# Patient Record
Sex: MALE | Race: WHITE | NOT HISPANIC OR LATINO | Employment: OTHER | URBAN - NONMETROPOLITAN AREA
[De-identification: names, ages, dates, MRNs, and addresses within clinical notes are randomized per-mention and may not be internally consistent; named-entity substitution may affect disease eponyms.]

---

## 2023-04-04 DIAGNOSIS — J38.3 OTHER DISEASES OF VOCAL CORDS: ICD-10-CM

## 2023-06-05 RX ORDER — PANTOPRAZOLE SODIUM 40 MG/1
40 TABLET, DELAYED RELEASE ORAL 2 TIMES DAILY
COMMUNITY

## 2023-06-05 RX ORDER — WARFARIN SODIUM 5 MG/1
TABLET ORAL DAILY
COMMUNITY

## 2023-06-05 RX ORDER — LORATADINE 10 MG/1
10 TABLET ORAL DAILY
COMMUNITY

## 2023-06-05 RX ORDER — ATORVASTATIN CALCIUM 80 MG/1
80 TABLET, FILM COATED ORAL DAILY
COMMUNITY

## 2023-06-05 RX ORDER — ALBUTEROL SULFATE 90 UG/1
2 AEROSOL, METERED RESPIRATORY (INHALATION) EVERY 6 HOURS PRN
COMMUNITY

## 2023-06-05 RX ORDER — SELENIUM 50 MCG
TABLET ORAL DAILY
COMMUNITY

## 2023-06-05 RX ORDER — CLONAZEPAM 1 MG/1
1 TABLET ORAL 3 TIMES DAILY
COMMUNITY

## 2023-06-05 RX ORDER — VENLAFAXINE 75 MG/1
75 TABLET ORAL 2 TIMES DAILY
COMMUNITY

## 2023-06-05 RX ORDER — AMLODIPINE BESYLATE 10 MG/1
10 TABLET ORAL DAILY
COMMUNITY

## 2023-06-05 NOTE — PRE-PROCEDURE INSTRUCTIONS
Pre-Surgery Instructions:   Medication Instructions   • amLODIPine (NORVASC) 10 mg tablet Take day of surgery  • Aspirin 81 MG CAPS Stop taking 7 days prior to surgery  • atorvastatin (LIPITOR) 80 mg tablet Take day of surgery  • calcium polycarbophil (FIBERCON) 625 mg chewable tablet Hold day of surgery  • Cholecalciferol 25 MCG (1000 UT) capsule Hold day of surgery  • clonazePAM (KlonoPIN) 1 mg tablet Take day of surgery  • lactobacillus acidophilus Hold day of surgery  • loratadine (CLARITIN) 10 mg tablet Hold day of surgery  • pantoprazole (PROTONIX) 40 mg tablet Take day of surgery  • venlafaxine (EFFEXOR) 75 mg tablet Take day of surgery  • warfarin (COUMADIN) 5 mg tablet Stop taking 7 days prior to surgery  Medication instructions for day surgery reviewed  Please use only a sip of water to take your instructed medications  Avoid all over the counter vitamins, supplements and NSAIDS for one week prior to surgery per anesthesia guidelines  Tylenol is ok to take as needed  You will receive a call one business day prior to surgery with an arrival time and hospital directions  If your surgery is scheduled on a Monday, the hospital will be calling you on the Friday prior to your surgery  If you have not heard from anyone by 8pm, please call the hospital supervisor through the hospital  at [phone]  Tivis Mihaela [phone])  Do not eat or drink anything after midnight the night before your surgery, including candy, mints, lifesavers, or chewing gum  Do not drink alcohol 24hrs before your surgery  Try not to smoke at least 24hrs before your surgery  Follow the pre surgery showering instructions as listed in the [facility] Surgical Experience Booklet” or otherwise provided by your surgeon's office  Do not shave the surgical area 24 hours before surgery   Do not apply any lotions, creams, including makeup, cologne, deodorant, or perfumes after showering on the day of your surgery  No contact lenses, eye make-up, or artificial eyelashes  Remove nail polish, including gel polish, and any artificial, gel, or acrylic nails if possible  Remove all jewelry including rings and body piercing jewelry  Wear causal clothing that is easy to take on and off  Consider your type of surgery  Keep any valuables, jewelry, piercings at home  Please bring any specially ordered equipment (sling, braces) if indicated  Arrange for a responsible person to drive you to and from the hospital on the day of your surgery  Visitor Guidelines discussed  Call the surgeon's office with any new illnesses, exposures, or additional questions prior to surgery  Please reference your [facility] Surgical Experience Booklet” for additional information to prepare for your upcoming surgery

## 2023-06-06 ENCOUNTER — ANESTHESIA EVENT (OUTPATIENT)
Dept: PERIOP | Facility: HOSPITAL | Age: 73
End: 2023-06-06
Payer: COMMERCIAL

## 2023-06-07 NOTE — ANESTHESIA PREPROCEDURE EVALUATION
Procedure:  MICRODIRECT LARYNGOSCOPY WITH POSSIBLE VOCAL FOLD STRIPPING AND/OR BIOPSIES (Throat)  BIOPSY OF THE BASE OF TONGUE VALLECULAR CYST (Mouth)  FLEXIBLE ESOPHAGOSCOPY AND FLEXIBLE BRONCHOSCOPY (Esophagus)    Relevant Problems   No relevant active problems      Smoking    GERD    HTN    OSAS      Physical Exam    Airway    Mallampati score: II  TM Distance: >3 FB  Neck ROM: full     Dental   No notable dental hx     Cardiovascular  Cardiovascular exam normal    Pulmonary  Pulmonary exam normal     Other Findings        Anesthesia Plan  ASA Score- 3     Anesthesia Type- general with ASA Monitors  Additional Monitors:   Airway Plan:           Plan Factors-Exercise tolerance (METS): >4 METS  Chart reviewed  EKG reviewed  Imaging results reviewed  Existing labs reviewed  Patient summary reviewed  Patient is a current smoker  Patient instructed to abstain from smoking on day of procedure  Patient did not smoke on day of surgery  Obstructive sleep apnea risk education given perioperatively  Induction- intravenous  Postoperative Plan-     Informed Consent- Anesthetic plan and risks discussed with patient  I personally reviewed this patient with the CRNA  Discussed and agreed on the Anesthesia Plan with the CRNA  Sharri Hernandez

## 2023-06-08 ENCOUNTER — HOSPITAL ENCOUNTER (OUTPATIENT)
Facility: HOSPITAL | Age: 73
Setting detail: OUTPATIENT SURGERY
Discharge: HOME | End: 2023-06-08
Attending: OTOLARYNGOLOGY | Admitting: OTOLARYNGOLOGY
Payer: COMMERCIAL

## 2023-06-08 ENCOUNTER — ANESTHESIA (OUTPATIENT)
Dept: PERIOP | Facility: HOSPITAL | Age: 73
End: 2023-06-08
Payer: COMMERCIAL

## 2023-06-08 VITALS
HEIGHT: 73 IN | BODY MASS INDEX: 30.22 KG/M2 | HEART RATE: 62 BPM | WEIGHT: 228 LBS | OXYGEN SATURATION: 97 % | DIASTOLIC BLOOD PRESSURE: 65 MMHG | SYSTOLIC BLOOD PRESSURE: 141 MMHG | TEMPERATURE: 97.6 F | RESPIRATION RATE: 20 BRPM

## 2023-06-08 DIAGNOSIS — J38.3 OTHER DISEASES OF VOCAL CORDS: ICD-10-CM

## 2023-06-08 LAB
APTT PPP: 27 SECONDS (ref 23–37)
INR PPP: 0.97 (ref 0.84–1.19)
PROTHROMBIN TIME: 13 SECONDS (ref 11.6–14.5)

## 2023-06-08 PROCEDURE — 88360 TUMOR IMMUNOHISTOCHEM/MANUAL: CPT | Performed by: STUDENT IN AN ORGANIZED HEALTH CARE EDUCATION/TRAINING PROGRAM

## 2023-06-08 PROCEDURE — 88305 TISSUE EXAM BY PATHOLOGIST: CPT | Performed by: STUDENT IN AN ORGANIZED HEALTH CARE EDUCATION/TRAINING PROGRAM

## 2023-06-08 PROCEDURE — 85610 PROTHROMBIN TIME: CPT | Performed by: ANESTHESIOLOGY

## 2023-06-08 PROCEDURE — 88342 IMHCHEM/IMCYTCHM 1ST ANTB: CPT | Performed by: STUDENT IN AN ORGANIZED HEALTH CARE EDUCATION/TRAINING PROGRAM

## 2023-06-08 PROCEDURE — 85730 THROMBOPLASTIN TIME PARTIAL: CPT | Performed by: ANESTHESIOLOGY

## 2023-06-08 PROCEDURE — 88341 IMHCHEM/IMCYTCHM EA ADD ANTB: CPT | Performed by: STUDENT IN AN ORGANIZED HEALTH CARE EDUCATION/TRAINING PROGRAM

## 2023-06-08 RX ORDER — ALBUTEROL SULFATE 2.5 MG/3ML
2.5 SOLUTION RESPIRATORY (INHALATION) ONCE
Status: COMPLETED | OUTPATIENT
Start: 2023-06-08 | End: 2023-06-08

## 2023-06-08 RX ORDER — SODIUM CHLORIDE, SODIUM LACTATE, POTASSIUM CHLORIDE, CALCIUM CHLORIDE 600; 310; 30; 20 MG/100ML; MG/100ML; MG/100ML; MG/100ML
INJECTION, SOLUTION INTRAVENOUS CONTINUOUS PRN
Status: DISCONTINUED | OUTPATIENT
Start: 2023-06-08 | End: 2023-06-08

## 2023-06-08 RX ORDER — METOPROLOL TARTRATE 5 MG/5ML
INJECTION INTRAVENOUS AS NEEDED
Status: DISCONTINUED | OUTPATIENT
Start: 2023-06-08 | End: 2023-06-08

## 2023-06-08 RX ORDER — CEPHALEXIN 500 MG/1
500 CAPSULE ORAL EVERY 12 HOURS SCHEDULED
Qty: 20 CAPSULE | Refills: 0 | Status: SHIPPED | OUTPATIENT
Start: 2023-06-08 | End: 2023-06-18

## 2023-06-08 RX ORDER — ROCURONIUM BROMIDE 10 MG/ML
INJECTION, SOLUTION INTRAVENOUS AS NEEDED
Status: DISCONTINUED | OUTPATIENT
Start: 2023-06-08 | End: 2023-06-08

## 2023-06-08 RX ORDER — HYDROCODONE BITARTRATE AND ACETAMINOPHEN 5; 325 MG/1; MG/1
2 TABLET ORAL EVERY 6 HOURS PRN
Qty: 30 TABLET | Refills: 0 | Status: SHIPPED | OUTPATIENT
Start: 2023-06-08 | End: 2023-06-18

## 2023-06-08 RX ORDER — FENTANYL CITRATE 50 UG/ML
INJECTION, SOLUTION INTRAMUSCULAR; INTRAVENOUS AS NEEDED
Status: DISCONTINUED | OUTPATIENT
Start: 2023-06-08 | End: 2023-06-08

## 2023-06-08 RX ORDER — LIDOCAINE HYDROCHLORIDE 20 MG/ML
INJECTION, SOLUTION EPIDURAL; INFILTRATION; INTRACAUDAL; PERINEURAL AS NEEDED
Status: DISCONTINUED | OUTPATIENT
Start: 2023-06-08 | End: 2023-06-08

## 2023-06-08 RX ORDER — DEXAMETHASONE SODIUM PHOSPHATE 10 MG/ML
INJECTION, SOLUTION INTRAMUSCULAR; INTRAVENOUS AS NEEDED
Status: DISCONTINUED | OUTPATIENT
Start: 2023-06-08 | End: 2023-06-08

## 2023-06-08 RX ORDER — PROPOFOL 10 MG/ML
INJECTION, EMULSION INTRAVENOUS AS NEEDED
Status: DISCONTINUED | OUTPATIENT
Start: 2023-06-08 | End: 2023-06-08

## 2023-06-08 RX ORDER — ONDANSETRON 2 MG/ML
INJECTION INTRAMUSCULAR; INTRAVENOUS AS NEEDED
Status: DISCONTINUED | OUTPATIENT
Start: 2023-06-08 | End: 2023-06-08

## 2023-06-08 RX ORDER — FENTANYL CITRATE/PF 50 MCG/ML
50 SYRINGE (ML) INJECTION
Status: DISCONTINUED | OUTPATIENT
Start: 2023-06-08 | End: 2023-06-08 | Stop reason: HOSPADM

## 2023-06-08 RX ADMIN — ROCURONIUM BROMIDE 25 MG: 10 INJECTION, SOLUTION INTRAVENOUS at 09:35

## 2023-06-08 RX ADMIN — LIDOCAINE HYDROCHLORIDE 100 MG: 20 INJECTION, SOLUTION EPIDURAL; INFILTRATION; INTRACAUDAL; PERINEURAL at 09:35

## 2023-06-08 RX ADMIN — ALBUTEROL SULFATE 2.5 MG: 2.5 SOLUTION RESPIRATORY (INHALATION) at 08:33

## 2023-06-08 RX ADMIN — RACEPINEPHRINE HYDROCHLORIDE 0.5 ML: 11.25 SOLUTION RESPIRATORY (INHALATION) at 10:17

## 2023-06-08 RX ADMIN — METOROPROLOL TARTRATE 2.5 MG: 5 INJECTION, SOLUTION INTRAVENOUS at 09:47

## 2023-06-08 RX ADMIN — PROPOFOL 200 MG: 10 INJECTION, EMULSION INTRAVENOUS at 09:35

## 2023-06-08 RX ADMIN — DEXAMETHASONE SODIUM PHOSPHATE 10 MG: 10 INJECTION, SOLUTION INTRAMUSCULAR; INTRAVENOUS at 09:35

## 2023-06-08 RX ADMIN — FENTANYL CITRATE 50 MCG: 50 INJECTION INTRAMUSCULAR; INTRAVENOUS at 09:35

## 2023-06-08 RX ADMIN — FENTANYL CITRATE 50 MCG: 50 INJECTION INTRAMUSCULAR; INTRAVENOUS at 09:42

## 2023-06-08 RX ADMIN — SUGAMMADEX 200 MG: 100 INJECTION, SOLUTION INTRAVENOUS at 09:53

## 2023-06-08 RX ADMIN — ONDANSETRON 4 MG: 2 INJECTION INTRAMUSCULAR; INTRAVENOUS at 09:42

## 2023-06-08 RX ADMIN — SODIUM CHLORIDE, SODIUM LACTATE, POTASSIUM CHLORIDE, AND CALCIUM CHLORIDE: .6; .31; .03; .02 INJECTION, SOLUTION INTRAVENOUS at 09:31

## 2023-06-08 NOTE — OP NOTE
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]    Procedure(s): MICRODIRECT LARYNGOSCOPY WITH POSSIBLE VOCAL FOLD STRIPPING AND/OR BIOPSIES  BIOPSY OF THE BASE OF TONGUE VALLECULAR CYST  FLEXIBLE ESOPHAGOSCOPY AND FLEXIBLE BRONCHOSCOPY    Specimen(s):  * No specimens in log *    Estimated Blood Loss:   Minimal    Drains:  * No LDAs found *    Anesthesia Type:   General    Operative Indications: 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]      Operative Findings:  Vocal cord leukoplakia, base of tongue lesion, mucous plugging in the lungs and hiatal hernia sliding-type with mild gastritis    Complications:   None    Procedure and Technique:  The patient was identified and taken to the operative suite  A timeout weas called  A 6 0 endotracheal tube was used for intubation after the successful induction of general anesthesia and it was taped to the left lower lip  The table was turned and the Peak blade was used to cannulate the esophageal inlet and the flexible esophagoscope was inserted into the esophagus under direct visualization  Using air insufflation, the esophagus was examined circumferentially to the GE junction  The GE junction was found to be irregular  There was a hiatal hernia present  The stomach was examined to the pylorus and retroflexion showed the body and antrum as well  The scope was then withdrawn  Next, the Dedo laryngoscope was placed into position, offering adequate visualization of the vocal cords  The suspension apparatus was attached and suspended from the  [email or web address] stand  Using the microscope on a setting of 400, the vocal cords were visualized   The vocal folds were stripped anteriorly to posteriorly, sparing the vocalis muscle, removing the areas of concern  Next, the slim bronchoscope was used, with the Josh Gilbert blade's guidance to go around the endotracheal tube with the cuff deflated and visualized the tracheal rings and traci which were sharp  Segmental bronchi bilaterally were visualized and found to be without lesions, although mucous plugging was relieved  The scope was withdrawn  The vocal folds were reinspected and no significant bleeding was encountered  The Dedo laryngoscope was then backed up and the base of tongue visualized with some cystic lesion right greater than left lingual tonsil which was biopsied with the forceps without significant bleeding  The inferior tonsillar cyst was visualized and found to be without areas of suspicion  There are multiple other lingual tonsil areas, cystic in nature, which were left native  The suspension apparatus was relieved, the microscope withdrawn and the Dedo laryngoscope withdrawn and an oral airway was placed  The patient was awakened and extubated without incident and taken to the PACU in excellent condition  Instrument and sponge counts were correct x2 at the end of the case  I was present for the entire procedure      Patient Disposition:  PACU         SIGNATURE: Fernand Simmonds, MD  DATE: June 8, 2023  TIME: 9:23 AM

## 2023-06-08 NOTE — ANESTHESIA POSTPROCEDURE EVALUATION
Post-Op Assessment Note    CV Status:  Stable  Pain Score: 0    Pain management: adequate     Mental Status:  Sleepy and arousable   Hydration Status:  Stable   PONV Controlled:  Controlled   Airway Patency:  Patent      Post Op Vitals Reviewed: Yes      Staff: CRNA         No notable events documented      /76 (06/08/23 1010)    Temp 97 6 °F (36 4 °C) (06/08/23 1010)    Pulse 77 (06/08/23 1010)   Resp 19 (06/08/23 1010)    SpO2 93 % (06/08/23 1010)

## 2023-06-08 NOTE — INTERVAL H&P NOTE
H&P reviewed  After examining the patient I find no changes in the patients condition since the H&P had been written      Vitals:    06/08/23 0815   Pulse: 67   Resp: 18   Temp: (!) 97 2 °F (36 2 °C)   SpO2: 95%

## 2023-06-08 NOTE — DISCHARGE SUMMARY
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]    HPI: Status post MDL with vocal fold stripping, laryngoscopy, flexible esophagoscopy and flexible bronchoscopy    Procedures Performed: No orders of the defined types were placed in this encounter  Summary of Hospital Course: Unremarkable    Significant Findings, Care, Treatment and Services Provided: Surgery    Complications: None    Discharge Diagnosis: Vocal cord and base of tongue lesions    Medical Problems     Resolved Problems  Date Reviewed: 6/8/2023   None         Condition at Discharge: good         Discharge instructions/Information to patient and family:   See after visit summary for information provided to patient and family  Provisions for Follow-Up Care:  See after visit summary for information related to follow-up care and any pertinent home health orders  PCP: No primary care provider on file  Disposition: Home    Planned Readmission: No      Discharge Statement   I spent 15 minutes discharging the patient  This time was spent on the day of discharge  I had direct contact with the patient on the day of discharge  Additional documentation is required if more than 30 minutes were spent on discharge  Discharge Medications:  See after visit summary for reconciled discharge medications provided to patient and family

## 2023-06-15 PROCEDURE — 88342 IMHCHEM/IMCYTCHM 1ST ANTB: CPT | Performed by: STUDENT IN AN ORGANIZED HEALTH CARE EDUCATION/TRAINING PROGRAM

## 2023-06-15 PROCEDURE — 88341 IMHCHEM/IMCYTCHM EA ADD ANTB: CPT | Performed by: STUDENT IN AN ORGANIZED HEALTH CARE EDUCATION/TRAINING PROGRAM

## 2023-06-15 PROCEDURE — 88305 TISSUE EXAM BY PATHOLOGIST: CPT | Performed by: STUDENT IN AN ORGANIZED HEALTH CARE EDUCATION/TRAINING PROGRAM

## 2023-06-15 PROCEDURE — 88360 TUMOR IMMUNOHISTOCHEM/MANUAL: CPT | Performed by: STUDENT IN AN ORGANIZED HEALTH CARE EDUCATION/TRAINING PROGRAM

## 2023-10-25 ENCOUNTER — APPOINTMENT (OUTPATIENT)
Dept: LAB | Facility: HOSPITAL | Age: 73
End: 2023-10-25
Payer: COMMERCIAL

## 2023-10-25 ENCOUNTER — HOSPITAL ENCOUNTER (OUTPATIENT)
Dept: RADIOLOGY | Facility: HOSPITAL | Age: 73
Discharge: HOME | End: 2023-10-25
Payer: COMMERCIAL

## 2023-10-25 ENCOUNTER — HOSPITAL ENCOUNTER (OUTPATIENT)
Dept: CT IMAGING | Facility: HOSPITAL | Age: 73
Discharge: HOME | End: 2023-10-25
Payer: COMMERCIAL

## 2023-10-25 DIAGNOSIS — Z86.79 PERSONAL HISTORY OF OTHER DISEASES OF THE CIRCULATORY SYSTEM: ICD-10-CM

## 2023-10-25 DIAGNOSIS — K21.9 GASTRO-ESOPHAGEAL REFLUX DISEASE WITHOUT ESOPHAGITIS: ICD-10-CM

## 2023-10-25 DIAGNOSIS — Z86.79 HX OF AORTIC ANEURYSM: ICD-10-CM

## 2023-10-25 LAB
BUN SERPL-MCNC: 11 MG/DL (ref 5–25)
CREAT SERPL-MCNC: 1.07 MG/DL (ref 0.6–1.3)
GFR SERPL CREATININE-BSD FRML MDRD: 68 ML/MIN/1.73SQ M

## 2023-10-25 PROCEDURE — 82565 ASSAY OF CREATININE: CPT

## 2023-10-25 PROCEDURE — 84520 ASSAY OF UREA NITROGEN: CPT

## 2023-10-25 PROCEDURE — 71260 CT THORAX DX C+: CPT

## 2023-10-25 PROCEDURE — 36415 COLL VENOUS BLD VENIPUNCTURE: CPT

## 2023-10-25 PROCEDURE — 74220 X-RAY XM ESOPHAGUS 1CNTRST: CPT

## 2023-10-25 RX ADMIN — IOHEXOL 85 ML: 350 INJECTION, SOLUTION INTRAVENOUS at 11:32

## 2024-02-08 ENCOUNTER — PATIENT OUTREACH (OUTPATIENT)
Age: 74
End: 2024-02-08

## 2024-02-08 NOTE — PROGRESS NOTES
[facility] [place] Encounter      Client Name: Dave Smart  Date: 2/8/2024    Staff Name: Kay Coombs   Location: Residence    Start Time: 10:35am End Time: 12:15pm  Driving time: 2 hours RT          Goal for today's meeting: Explain VVP to  and determine eligibility for VVP if interested      Summary of today's meeting: Clinical coordinator conducted a pre-engagement meeting with [place]. He stated he was interested in the program. Eligibility was confirmed via SQUARES, CSSR-S, and psychosocial assessment.      Plan for next meeting: Clinical coordinator will complete remaining intake paperwork on Thursday 2/15/24.

## 2024-02-16 ENCOUNTER — PATIENT OUTREACH (OUTPATIENT)
Age: 74
End: 2024-02-16

## 2024-02-16 NOTE — PROGRESS NOTES
[facility] [place] Encounter      Client Name: Dave Smart  Date: 02/15/2024    Staff Name: Kay Coombs   Location: Residence    Start Time: 2:00pm End Time: 2:45pm  Driving time: 2 hours RT          Goal for today's meeting: Complete VVP intake paperwork and [facility] baseline MH assessments      Summary of today's meeting: Care manager completed all remaining VVP intake paperwork with [place]. Care manager also completed all VA baseline MH assessments with .  expressed a desire to quit smoking and is interested in looking into replacement behaviors to assist him with this. He is also interested in working with the [facility] for burial arrangements. Care manager will look into resources regarding his needs.      Plan for next meeting: Care manager will inform [place]'s CPS that an appointment can be scheduled moving forward. Care manager will meet with  during the week of 2/26/24.

## 2024-02-20 ENCOUNTER — TELEPHONE (OUTPATIENT)
Age: 74
End: 2024-02-20

## 2024-02-28 ENCOUNTER — PATIENT OUTREACH (OUTPATIENT)
Age: 74
End: 2024-02-28

## 2024-02-29 NOTE — PROGRESS NOTES
[facility] [place] Service Encounter      Client Name: Dave Smart  Date: 28FEB24    Staff Name: Tenzin   Location: [place] [facility]    Start Time: 0900 End Time: 1030  Driving time: 130mins          Goal for today's meeting: initial CPS meeting      Summary of today's meeting: Introduced ourselves, shared some related experiences      Plan for next meeting: Continue to build rapport

## 2024-03-06 ENCOUNTER — PATIENT OUTREACH (OUTPATIENT)
Age: 74
End: 2024-03-06

## 2024-03-06 NOTE — PROGRESS NOTES
"[facility] [place] Service Encounter      Client Name: Kareen Smart  Date: 6MAR24    Staff Name: Tenzin   Location: residence, [place]    Start Time: 0900 End Time: 1030  Driving time: 130mins          Goal for today's meeting: build rapport, program expectations      Summary of today's meeting: Today kareen shard some past experiences, I was able to validate what was shared. Kareen is unsure how he can benefit from the program. When asked directly about suicidal thoughts, he replied \"no\" without hesitation, I have no reason to believe otherwise. Kareen does present some mobility issues that will limit some of things we can do outside.      Plan for next meeting: continue to build rapport and discover where support can be provided  "

## 2024-03-12 ENCOUNTER — PATIENT OUTREACH (OUTPATIENT)
Age: 74
End: 2024-03-12

## 2024-03-12 ENCOUNTER — TELEPHONE (OUTPATIENT)
Age: 74
End: 2024-03-12

## 2024-03-12 NOTE — PROGRESS NOTES
[facility] [place] Encounter      Client Name: Dave Smart  Date: 13MAR24    Staff Name: Tenzin    Location:      Start Time:   End Time:    Driving time:            Goal for today's meeting:        Summary of today's meeting: Meeting cancelled(12MAR24)  Has [facility] appt. Confirmed meeting for 19MAR24      Plan for next meeting:

## 2024-03-20 ENCOUNTER — TELEPHONE (OUTPATIENT)
Age: 74
End: 2024-03-20

## 2024-03-20 ENCOUNTER — PATIENT OUTREACH (OUTPATIENT)
Age: 74
End: 2024-03-20

## 2024-03-20 NOTE — PROGRESS NOTES
[facility] [place] Encounter      Client Name: Dave Smart  Date: 20MAR24    Staff Name: Tenzin   Location:      Start Time:   End Time:    Driving time:            Goal for today's meeting:        Summary of today's meeting: Cancelled by phone by Dave at 0800. Reason provided: has [facility] appts today      Plan for next meeting:

## 2024-03-27 ENCOUNTER — PATIENT OUTREACH (OUTPATIENT)
Age: 74
End: 2024-03-27

## 2024-03-27 NOTE — PROGRESS NOTES
[facility] [place] En[facility]      Client Name: Dave Smart  Date: 27MAR24    Staff Name: Tenzin   Location: residence, [place]    Start Time: 0900 End Time: 1000  Driving time: 130mins          Goal for today's meeting: Catch up from last two missed appts      Summary of today's meeting: Dave shared a few new stories of his past today. I was to validate a couple of them. Dave is  unsure what exactly the program can assist him. I explained the best I could and made sure he understood that I am there for peer support. He said enjoys the conversation but doesn't believe VVP can assist with any goals. Dave has been unable to establish any SMART goal at this time.       Plan for next meeting: discuss potential goals

## 2024-04-03 ENCOUNTER — PATIENT OUTREACH (OUTPATIENT)
Age: 74
End: 2024-04-03

## 2024-04-04 NOTE — PROGRESS NOTES
[facility] [place] Encounter      Client Name: Dave Smart  Date: 3APR24    Staff Name: Tenzin   Location: [facility] [place]    Start Time: 0900 End Time: 1000  Driving time: 130mins          Goal for today's meeting: continue to build rapport, establish goal      Summary of today's meeting: Dave had now issue sharing today. We spoke of hunting most of our time today. Dave is finding it difficult to establish a goal.      Plan for next meeting: establish a goal

## 2024-04-10 ENCOUNTER — PATIENT OUTREACH (OUTPATIENT)
Age: 74
End: 2024-04-10

## 2024-04-11 NOTE — PROGRESS NOTES
[facility] [place] Encounter      Client Name: Dave DIAMOND Berry  Date: 10APR24    Staff Name: Tenzin   Location: [facility]    Start Time: 0900 End Time: 1020  Driving time: 130mins          Goal for today's meeting: establish goal/s      Summary of today's meeting: Today Dave and I discussed goals. After much conversation we were able to establish a goal.       Plan for next meeting: work  on goal

## 2024-04-17 ENCOUNTER — PATIENT OUTREACH (OUTPATIENT)
Age: 74
End: 2024-04-17

## 2024-04-17 NOTE — PROGRESS NOTES
[facility] [place] Encounter      Client Name: Dave Smart  Date: 17apr24    Staff Name: Tenzin   Location:      Start Time:   End Time:    Driving time:            Goal for today's meeting:        Summary of today's meeting: Cancelled by [facility]  reason: Mgr assigned task      Plan for next meeting:

## 2024-05-08 ENCOUNTER — PATIENT OUTREACH (OUTPATIENT)
Age: 74
End: 2024-05-08

## 2024-05-09 NOTE — PROGRESS NOTES
[facility] [place] Encounter      Client Name: Dave Smart  Date: May 8, 2024    Staff Name: Ajit Sutton   Location: [facility]    Start Time: 10:00 AM End Time: 11:15 AM  Driving time: 120 Min          Goal for today's meeting: Today's goal includes initial meeting.      Summary of today's meeting: This CPS met with Dave on May 8th at approximately 10 AM. This CPS conducted an initial meeting with Dave and reviewed the program. 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 Dave also discussed his hobbies and recreational goals for the future. This CPS also shared similar experiences and reviewed hobbies and interests during the meeting.       Plan for next meeting: Continue providing ongoing peer support services.

## 2024-05-17 ENCOUNTER — PATIENT OUTREACH (OUTPATIENT)
Age: 74
End: 2024-05-17

## 2024-05-22 ENCOUNTER — PATIENT OUTREACH (OUTPATIENT)
Age: 74
End: 2024-05-22

## 2024-05-22 NOTE — PROGRESS NOTES
[facility] [place] Service Encounter      Client Name: Dave Smart  Date: May 22, 2024    Staff Name: Ajit Sutton   Location: Office    Start Time: 8:55 AM End Time: 9:00 AM  Driving time: N/A          Goal for today's meeting: Cancellation of upcoming meeting      Summary of today's meeting: This CPS received a phone call from Dave regarding our upcoming meeting. Dave stated that our meeting will have to be cancelled due to  a medical appointment.        Plan for next meeting: Reschedule and provide ongoing peer support services.

## 2024-05-29 ENCOUNTER — PATIENT OUTREACH (OUTPATIENT)
Age: 74
End: 2024-05-29

## 2024-06-03 ENCOUNTER — PATIENT OUTREACH (OUTPATIENT)
Age: 74
End: 2024-06-03

## 2024-06-05 NOTE — PROGRESS NOTES
[facility] [place] Encounter      Client Name: Dave Smart  Date: 06/03/2024    Staff Name: Kay oCombs   Location: Residence    Start Time: 12:45pm End Time: 1:15pm  Driving time: 2 hours RT          Goal for today's meeting: Follow up on resources      Summary of today's meeting: [place] updated care manager on recent [facility] appointments. He received a new motorized scooter from the [facility] and was able to have a hitch installed on his vehicle to transport the scooter.  discussed his frustrations with his [facility] doctors; his doctors are frustrated with him due to him continuing to smoke.  is not interested in quitting his tobacco use at this time.       Plan for next meeting: Care manager will meet with Jose on 6/19/24.

## 2024-07-26 ENCOUNTER — PATIENT OUTREACH (OUTPATIENT)
Age: 74
End: 2024-07-26

## 2024-07-30 NOTE — PROGRESS NOTES
[facility] [place] Encounter      Client Name: Dave Smart  Date: July 26, 2024    Staff Name: Ajit Sutton   Location: [facility]    Start Time: 9:00 AM End Time: 10:00 AM  Driving time: 120 Min          Goal for today's meeting: Today's goal includes building rapport and reviewing current wellness goals.       Summary of today's meeting: This CPS met with Dave on July 26th at approximately 9 AM. During this meeting Dave discussed his current wellness plans for spending time with his children and grandchildren during the upcoming hunting season. Dave stated that his family comes together during the fall season and shares time with his family to be active outdoors. Dave also discussed his ongoing [facility] appointments with the [facility] for pain management and is hoping to return to discuss current treatment options.      Plan for next meeting: Continue providing ongoing peer support services.

## 2024-08-02 ENCOUNTER — PATIENT OUTREACH (OUTPATIENT)
Age: 74
End: 2024-08-02

## 2024-08-09 ENCOUNTER — PATIENT OUTREACH (OUTPATIENT)
Age: 74
End: 2024-08-09

## 2024-08-16 ENCOUNTER — PATIENT OUTREACH (OUTPATIENT)
Age: 74
End: 2024-08-16

## 2024-08-21 NOTE — PROGRESS NOTES
[facility] [place] Encounter      Client Name: Dave Smart  Date: August 16, 2024    Staff Name: Ajit Sutton   Location: August 16, 2024    Start Time: 9:00 AM End Time: 10:00 AM  Driving time: 120 Min          Goal for today's meeting: Today's goal includes discussing current wellness goals and providing peer support service.      Summary of today's meeting: This CPS met with Dave on August 16th at approximately 9 AM. During this meeting Dave discussed his current wellness goals that include spending time with his family and set up his property for the upcoming hunting season. Dave spent the majority of this meeting discussing his current relationship with his grandchildren and hobbies that he pursues.       Plan for next meeting: Continue providing ongoing peer support services.

## 2024-08-30 ENCOUNTER — PATIENT OUTREACH (OUTPATIENT)
Age: 74
End: 2024-08-30

## 2024-09-04 NOTE — PROGRESS NOTES
[facility] [place] Encounter      Client Name: Dave Smart  Date: August 30, 2024    Staff Name: Ajit Sutton   Location: [facility]    Start Time: 9:00 AM End Time: 10:00 AM  Driving time: 120 Min          Goal for today's meeting: Today's goal includes discussing Ju goals and providing peer support services.      Summary of today's meeting: This CPS met with Dave on August 30th at approximately 9 AM. During this meeting this CPS discussed Ju possible wellness goals and ways to complete them. Dave identified that he would like to get control of his pain management and is hoping to see a specialist to further discuss this concern. Dave finished this meeting by reviewing his past  history and family history.       Plan for next meeting: Continue providing ongoing peer support services.

## 2024-09-13 ENCOUNTER — PATIENT OUTREACH (OUTPATIENT)
Age: 74
End: 2024-09-13

## 2024-09-18 ENCOUNTER — PATIENT OUTREACH (OUTPATIENT)
Age: 74
End: 2024-09-18

## 2024-09-20 ENCOUNTER — PATIENT OUTREACH (OUTPATIENT)
Age: 74
End: 2024-09-20

## 2024-09-25 NOTE — PROGRESS NOTES
[facility] [place] Encounter      Client Name: Dave Smart  Date: September 20, 2024    Staff Name: Ajit Sutton   Location: [facility]    Start Time: 9:00 AM End Time: 10:00 AM  Driving time: 120 Min          Goal for today's meeting: Today's goal includes conducting an outdoor wellness activity and providing peer support services.      Summary of today's meeting: This CPS met with Dave on September 20th at approximately 9 AM. During this meeting Dave requested to conduct an outdoor wellness activity on his property. This CPS was introduced to Ju property boundaries and places he and his family frequent to relax during the day. Dave is hoping to enjoy more time outdoors during the hunting season and is also looking forward to spending increased time with his grandson.      Plan for next meeting: Continue providing ongoing peer support services.

## 2024-10-04 ENCOUNTER — PATIENT OUTREACH (OUTPATIENT)
Age: 74
End: 2024-10-04

## 2024-10-08 NOTE — PROGRESS NOTES
[facility] [place] Encounter      Client Name: Dave Smart  Date: 9/18/2024    Staff Name: Cassidy Noble   Location: [facility]    Start Time: 1130 am End Time: 1200 pm  Driving time: 120 Minutes RT          Goal for today's meeting: To complete VVP paperwork.      Summary of today's meeting: CM met with [place] to complete the PHQ-9, VICTOR HUGO, [place], GSE and Satisfaction Survey. All were completed by [place].       Plan for next meeting: Offer resources as needed.

## 2024-10-09 NOTE — PROGRESS NOTES
[facility] [place] Encounter      Client Name: Dave Smart  Date: October 4, 2024    Staff Name: Ajit Sutton   Location: [facility]    Start Time: 9:00 AM End Time: 10:00 AM  Driving time: 120 Min          Goal for today's meeting: Today's goal includes reviewing Ju current wellness goals and provide peer support services.      Summary of today's meeting: This CPS met with Dave on October 4th at approximately 9 AM. During this meeting Dave discussed spending time with his son and grandchildren during the hunting season. Dave also stated that he is hoping to remain active and will be discussing pain management treatments at his next medical appointment. Dave finished this meeting discussing his prior employment and residence history.       Plan for next meeting: Continue providing peer support services.

## 2024-10-25 ENCOUNTER — PATIENT OUTREACH (OUTPATIENT)
Age: 74
End: 2024-10-25

## 2024-10-31 NOTE — PROGRESS NOTES
[facility] [place] Service Encounter      Client Name: Dave Smart  Date: October 25, 2024    Staff Name: Ajit Sutton   Location: [facility]    Start Time: 9:00 AM End Time: 10:00 AM  Driving time: 120 AM          Goal for today's meeting: Today's goal includes discussing current wellness goals and providing peer support services.      Summary of today's meeting: This CPS met with Dave on October 25th at approximately 9 AM. During this meeting Dave discussed his current enjoyment during the hunting season. Dave stated that he has been enjoying time on his property with his friends and family hunting their land. Dave also stated that between his outdoor chores and hunting his day is usually finished by the early afternoon. During our meeting Dave shared past  history and prior duties during service.       Plan for next meeting: Continue providing ongoing peer support services.

## 2024-11-01 ENCOUNTER — PATIENT OUTREACH (OUTPATIENT)
Age: 74
End: 2024-11-01

## 2024-11-06 NOTE — PROGRESS NOTES
[facility] [place] Encounter      Client Name: Dave Smart  Date: November 1, 2024    Staff Name: Ajit Sutton   Location: [facility]    Start Time: 9:00 AM End Time: 10:00 AM  Driving time: 120 Min          Goal for today's meeting: Today's goal includes discussing Ju wellness goals and providing peer support services.      Summary of today's meeting: This CPS met with Dave on November 1st at approximately 9 AM. During this time Dave stated that he has been having leg pain and has been unable to work outside for any long extended time. Dave discussed his upcoming appointment and stated the plans to discuss his lack of mobility in future medical meetings. Dave discussed his excitement for the ongoing hunting season and is hoping to spend time with family and friends during the season while trying to stay active outdoors.      Plan for next meeting: Continue providing ongoing peer support services.

## 2024-11-08 ENCOUNTER — PATIENT OUTREACH (OUTPATIENT)
Age: 74
End: 2024-11-08

## 2024-11-13 NOTE — PROGRESS NOTES
[facility] [place] Encounter      Client Name: Dave Smart  Date: November 8, 2024    Staff Name: Ajit Sutton   Location: [facility]    Start Time: 9:00 AM End Time: 10:00 AM  Driving time: 120 Min          Goal for today's meeting: Today's goal includes reviewing Ju current health concerns and discussing his wellness goals.      Summary of today's meeting: This [facility] met with Dave on November 8th at approximately 9 AM. During this meeting Dave discussed his current health concerns and upcoming meetings with [facility] to discuss them. Dave also reviewed his goals to spend time with his family during the fall and current hunting season. Dave discussed plans to visit family in the coming weeks. Dave finished this meeting discussing his  service.      Plan for next meeting: Continue providing ongoing peer support services.

## 2024-11-15 ENCOUNTER — PATIENT OUTREACH (OUTPATIENT)
Age: 74
End: 2024-11-15

## 2024-11-22 ENCOUNTER — PATIENT OUTREACH (OUTPATIENT)
Age: 74
End: 2024-11-22

## 2024-12-04 ENCOUNTER — PATIENT OUTREACH (OUTPATIENT)
Age: 74
End: 2024-12-04

## 2024-12-04 NOTE — PROGRESS NOTES
[facility] [place] Encounter      Client Name: Dave Smart  Date: November 29, 2024    Staff Name: Ajit Sutton   Location: [facility]    Start Time: 9:00 AM End Time: 10:00 AM  Driving time: 120 Min          Goal for today's meeting: Today's goal includes discussing Ju ongoing wellness goals and provide peer support services.      Summary of today's meeting: This CPS met with Dave on November 29th at approximately 9 AM. During this meeting Dave discussed his ongoing goal of being active during the hunting season. Dave also expressed his concerns wit his current health that includes his mobility. Dave stated that he has an upcoming medical appointment and will be discussing this further with his primary care physician. Dave finished this meeting discussing his family dynamic and future holiday goals with them.      Plan for next meeting: Continue providing ongoing peer support services.

## 2024-12-13 ENCOUNTER — PATIENT OUTREACH (OUTPATIENT)
Age: 74
End: 2024-12-13

## 2024-12-27 ENCOUNTER — PATIENT OUTREACH (OUTPATIENT)
Age: 74
End: 2024-12-27

## 2025-01-07 NOTE — PROGRESS NOTES
[facility] Encounter      Client Name: Dave Smart  Date: December 27, 2024    Staff Name: Ajit Sutton   Location: [facility]    Start Time: 9:00 AM End Time: 10:00 AM  Driving time: 120 Min          Goal for today's meeting: Today's goal includes discussing holiday goals and personal wellness while providing peer support services.      Summary of today's meeting: This CPS met with Dave on December 27th at approximately 9 AM. During this meeting Dave shared his current holiday goals and his upcoming plans for the hunting season. This CPS also discussed his current goals as well as his past medical history. Dave finished this meeting discussing his service and past  history.      Plan for next meeting: Continue providing ongoing peer support services.

## 2025-01-10 ENCOUNTER — PATIENT OUTREACH (OUTPATIENT)
Age: 75
End: 2025-01-10

## 2025-01-13 ENCOUNTER — PATIENT OUTREACH (OUTPATIENT)
Age: 75
End: 2025-01-13

## 2025-01-15 NOTE — PROGRESS NOTES
[facility] [place] En[facility]      Client Name: Dave Smart  Date: January 10, 2024    Staff Name: Ajit Sutton   Location: [facility]    Start Time: 9;00 AM End Time: 10:00 AM  Driving time: 120 Min          Goal for today's meeting: Today's goal includes meeting to discuss current wellness goals and provide peer support services.      Summary of today's meeting: This CPS met with Dave on January 10th at approximately 9 AM. During this meeting Dave discussed his goals of continue hunting during the current season. Dave also stated that he is hoping to spend time with his son and grandson in the near future for hunting trips. Dave also discussed his ongoing lower back pain and stated that he will be talking to his primary care physician in the near future.      Plan for next meeting: Continue providing ongoing peer  support services.

## 2025-01-17 ENCOUNTER — PATIENT OUTREACH (OUTPATIENT)
Age: 75
End: 2025-01-17

## 2025-01-20 ENCOUNTER — PATIENT OUTREACH (OUTPATIENT)
Age: 75
End: 2025-01-20

## 2025-01-24 ENCOUNTER — PATIENT OUTREACH (OUTPATIENT)
Age: 75
End: 2025-01-24

## 2025-01-31 ENCOUNTER — PATIENT OUTREACH (OUTPATIENT)
Age: 75
End: 2025-01-31

## 2025-02-03 ENCOUNTER — PATIENT OUTREACH (OUTPATIENT)
Age: 75
End: 2025-02-03

## 2025-02-05 NOTE — PROGRESS NOTES
[facility] [place] Encounter      Client Name: Dave Smart  Date: January 24, 2024    Staff Name: Ajit Sutton   Location: [facility]    Start Time: 9:00 AM End Time: 10:00 AM  Driving time: 120 Min          Goal for today's meeting: Today's goals include discussing current wellness goals and goals pertaining to mobility and positive health.      Summary of today's meeting: This CPS met with Dave on January 24th at approximately 9 AM. During this meeting Dave stated that he has been having trouble being mobile due to his health and leg strength. Dave also reviewed his current goals with this CPS. Dave stated that he is hoping to consider alternate heating sources for their home and has been researching options to consider.      Plan for next meeting: Continue providing ongoing peer support services.

## 2025-02-14 ENCOUNTER — PATIENT OUTREACH (OUTPATIENT)
Age: 75
End: 2025-02-14

## 2025-02-20 ENCOUNTER — PATIENT OUTREACH (OUTPATIENT)
Age: 75
End: 2025-02-20

## 2025-02-26 NOTE — PROGRESS NOTES
"[facility] [place] Encounter      Client Name: Dave Smart  Date: 2/20/2025    Staff Name: Siomara Langstonieux   Location: Residence    Start Time: 1100 End Time: 1200  Driving time: 2 hrs          Goal for today's meeting: Initial meeting, begin rapport building process      Summary of today's meeting: Today I met with Dave in his home for our first meeting. I spent the majority of the time listening to Dave share stories of his life and career. Dave shared many stories about his  experiences and how they have impacted his physical and mental health. Dave stated that his knees were  \"shot\" and that there was nothing any of the medical providers could due to repair their condition. Dave also shared information on his family with me, and stories regarding his sons and daughter. He shared that his daughter had passed a number of years ago from substance abuse. I supported Dave by listening to his stories, and validating the experiences and emotions he has been through and has felt. We also talked about how he is looking forward to warmer weather so he can get outside and enjoy his property. He did say it is getting harder and harder to get around mobility wise, but that he is still managing. Overall, Dave seemed appreciative to have someone to talk to about his life and experiences.      Plan for next meeting: Continue rapport building   "

## 2025-02-27 ENCOUNTER — PATIENT OUTREACH (OUTPATIENT)
Age: 75
End: 2025-02-27

## 2025-03-05 ENCOUNTER — PATIENT OUTREACH (OUTPATIENT)
Age: 75
End: 2025-03-05

## 2025-03-06 NOTE — PROGRESS NOTES
"[facility] [place] Encounter      Client Name: Dave Smart  Date: 2/27/2025    Staff Name: Siomara Acevedo   Location: Phone    Start Time: 1300 End Time: 1345  Driving time: n/a          Goal for today's meeting: Check-in      Summary of today's meeting: Today I had a brief check in with Dave. He and his GF were in the middle of \"spring cleaning\" when I called. He informed me that they were cleaning the windows and changing the curtains. He spent some time discussing some medical conditions he chronically experiences, such as with his knees, and explained that those issues make it hard to maintain the home. We discussed how Dave was looking forward to warmer weather so he can get outside on his tractor. We ended the conversation by planning our next in-person meeting.      Plan for next meeting: Check- in (in person)  "

## 2025-03-06 NOTE — PROGRESS NOTES
"[facility] [place] Encounter      Client Name: Dave Smart  Date: 3/5/2025    Staff Name: Siomara Acevedo   Location: Residence    Start Time: 1200 End Time: 1300  Driving time: 2 hrs          Goal for today's meeting: Check-in      Summary of today's meeting: I met with Dave at his residence today for our weekly check-in. Dave shared with me that he felt \"pretty soar and banged up\" from being out on the tractor the day prior. During our meeting, Dave shared his knowledge of VA benefits with me and shared what the VA has done to assist him. He also discussed his love for hunting and fishing and shared stories of when he was younger and would hunt and fish with friends and family. I also learned that Dave had a love for [place] and used to play in semi-professional tournaments. He stated that he can no longer do those things because of his lack of mobility but still likes to get outside and around his property. Dave seemed in good spirits during our conversation and stated that he enjoys the conversations we have during our one on ones      Plan for next meeting: Check-in  "

## 2025-03-21 ENCOUNTER — PATIENT OUTREACH (OUTPATIENT)
Age: 75
End: 2025-03-21

## 2025-03-28 ENCOUNTER — PATIENT OUTREACH (OUTPATIENT)
Age: 75
End: 2025-03-28

## 2025-03-28 NOTE — PROGRESS NOTES
[facility] [place] Encounter      Client Name: Dave Smart  Date: March 28, 2025    Staff Name: Ajit Sutton   Location: [facility]    Start Time: 10:00 AM End Time: 11:00 AM  Driving time: 120 Min          Goal for today's meeting: Today's goals include discussing current wellness goals and providing ongoing peer support services.      Summary of today's meeting: This CPS met with Dave on March 28th at approximately 10 AM. During this time Dave stated that he has been having severe back pain and has scheduled  [facility] appointment to review with his doctors. Dave shared that his favorite pastime in recent weeks and been watching television and playing cards. Dave shared that he has enjoyed playing cards with close friends and family in the past and is hoping to set something up in the future.      Plan for next meeting: Continue providing ongoing peer support services.

## 2025-03-28 NOTE — PROGRESS NOTES
[facility] [place] Encounter      Client Name: Dave Smart  Date: March 21, 2025    Staff Name: Ajit Sutton   Location: [facility]    Start Time: 10:30 AM End Time: 11:30 AM  Driving time: 120 Min          Goal for today's meeting: Today's goal includes discussing Ju ongoing goals and providing continued peer support services.      Summary of today's meeting: This CPS met with Dave on March 21st at approximately 10:30 AM. During this meeting Dave stated that he has been enjoying time indoors watching television. Dave stated that he is looking forward to his medical appointment to discuss ongoing pain concerns. Dave stated that he is looking forward to warmer weather and is hoping to work on his property.      Plan for next meeting: Continue providing ongoing peer support services.

## 2025-04-02 ENCOUNTER — PATIENT OUTREACH (OUTPATIENT)
Age: 75
End: 2025-04-02

## 2025-04-02 NOTE — PROGRESS NOTES
[facility] [place]      Client Name: Dave Smart  Date: 4/2/2025    Staff Name: Cassidy Noble   Location: [facility]    Start Time: 11:30 am End Time: 12:00 pm  Driving time: 120 Minutes Round Trip          Goal for today's meeting: To complete paperwork questionnaires.      Summary of today's meeting: CM conducted the PHQ-9, [place] and GSE questionnaires with . [place] is positive and looks forward to his future.      Plan for next meeting: To meet with assigned CPS for weekly meeting.

## 2025-04-04 ENCOUNTER — PATIENT OUTREACH (OUTPATIENT)
Age: 75
End: 2025-04-04

## 2025-04-07 NOTE — PROGRESS NOTES
[facility] [place] Encounter      Client Name: Dave Smart  Date: April 4, 2025    Staff Name: Ajit Sutton   Location: [facility]    Start Time: 10:00 AM End Time: 11:00 AM  Driving time: 120 Min          Goal for today's meeting: Today's goals include discussing personal wellness goals and providing peer support services.      Summary of today's meeting: This CPS met with Dave on April 4th at approximately 10 AM. During this meeting Dave stated that he has been having back pain and has been unable to help around the property. Dave stated that he will be discussing with his med management doctor at his next visit and stated that it has been progressing. Dave also shared about home improvements and is happy to continually see his house get updated. During this meeting Dave continued talking about getting outside as soon as he's able.      Plan for next meeting: Continue providing ongoing peer support services.

## 2025-04-18 ENCOUNTER — PATIENT OUTREACH (OUTPATIENT)
Age: 75
End: 2025-04-18

## 2025-05-01 NOTE — PROGRESS NOTES
[facility] [place] Encounter      Client Name: Dave Smart  Date: April 18, 2025    Staff Name: Ajit Sutton   Location: [facility]    Start Time: 10:30 AM End Time: 11:30 AM  Driving time: 120 Min          Goal for today's meeting: Today's goal includes discussing Ju current wellness goals and provide peer support services.      Summary of today's meeting: This CPS met with Dave on April 18th at approximately 10:30 AM. During this meeting Dave reviewed his current goals with this CPS. Dave stated that he is hoping to spend more time outside in the warmer weather. Dave also stated that he would also like to meet with his medical provider to discuss more pain management strategies. Dave stated that his lower back pain and leg pain has been getting worse and will be discussing this at his next medical appointment.      Plan for next meeting: Continue providing ongoing peer support services.

## 2025-05-09 ENCOUNTER — PATIENT OUTREACH (OUTPATIENT)
Age: 75
End: 2025-05-09

## 2025-05-21 NOTE — PROGRESS NOTES
[facility] [place] Encounter      Client Name: Dave Smart  Date: May 9, 2025    Staff Name: Ajit Sutton   Location: [facility]    Start Time: 10:00 AM End Time: 10:15 AM  Driving time: N/A          Goal for today's meeting: Today's goal includes reviewing Dave's current wellness goals and provide peer support services.      Summary of today's meeting: This CPS met with Ju via telephone on May 9th at approximately 10 AM. During this meeting Dave and his wife discussed their current shared goals of maintaining the property and staying healthy. This CPS discussed our current schedule and reviewed possible dates to meet in person due to this meeting being moved to phone.       Plan for next meeting: Continue providing ongoing peer support services.

## 2025-06-03 ENCOUNTER — PATIENT OUTREACH (OUTPATIENT)
Age: 75
End: 2025-06-03

## 2025-06-05 NOTE — PROGRESS NOTES
[facility] [place] En[facility]      Client Name: Dave Smart  Date: 3 Kezia 2025    Staff Name: [facility] Hensonell   Location: Residence    Start Time: 1300 End Time: 1415  Driving time: 1 hour 10 min          Goal for today's meeting: Initial meeting      Summary of today's meeting:     Goals:   There were really no goals in his wellness plan. Plan is to create some next metting    Narrative:  Met with Goldsmith at home at 1300 for initial encounter. He was in a lot of pain but was out with partner doing physical labor around their property. He was in the best spirits considering his back pain. He mentioned he will soon be going to a specialist to see if there is anything that can be corrected, but he is not hopeful. We discussed meeting every week and that next appointment we will be going over and setting some goals within his limitations.       Plan for next meeting: We will discuss and create some practical goals to work toward and discuss there back appointment.

## 2025-06-11 ENCOUNTER — PATIENT OUTREACH (OUTPATIENT)
Age: 75
End: 2025-06-11

## 2025-06-13 NOTE — PROGRESS NOTES
[facility] [place] Encounter      Client Name: Dave Smart  Date: 11 June 2025    Staff Name: [facility] Maynard   Location: Residence    Start Time: 1300 End Time: 1400  Driving time: 1 hour          Goal for today's meeting: Continue to identify goals to work toward.      Summary of today's meeting:   Goals:   Having some difficulty identifying goals within his limitations. Well think of some creative suggestions so that  can create one or two and have something solid to work toward.   Narrative:  Met with  at home at 1300. He was in some pain again, but we had our discussion outside on the porch instead of inside which he usually prefers. We talked about his family and outdoors activities. He is planning to help chop wood this weekend which gets him outside.         Plan for next meeting: Next week we will come up with at least one goal to work toward that will benefit him physically and mentally within his capabilities.

## 2025-06-18 ENCOUNTER — PATIENT OUTREACH (OUTPATIENT)
Age: 75
End: 2025-06-18

## 2025-07-01 NOTE — PROGRESS NOTES
[facility] [place] Encounter      Client Name: Dave Smart  Date: 18 June 2025    Staff Name: [facility] Maynard   Location: Home    Start Time: 1200 End Time: 1300  Driving time: 1 hour          Goal for today's meeting: Attempt to create goals within limitations      Summary of today's meeting:   Goals:   Having some difficulty identifying goals within his limitations. Will think of some creative suggestions so that [place] can create one or two and have something solid to work toward.   Narrative:  Met with  at home at 1300. He had been sleeping and we did our encounter inside. He had gone to another spine specialist with no good news on them being able to help with his pain and mobility. He expressed the dislike for the shots they can give him for his pain because of the drive and length of pain free time from each. Stated he would rather deal with the pain. He is open to other options but not very optimistic. He did want to be out more often helping his partner with chores around the property and this will most likely become his goal.      Plan for next meeting:   Next week we will continue to work toward him being outside more often within his limits to benefit him physically and mentally within his capabilities.

## 2025-07-02 ENCOUNTER — PATIENT OUTREACH (OUTPATIENT)
Age: 75
End: 2025-07-02

## 2025-07-09 ENCOUNTER — PATIENT OUTREACH (OUTPATIENT)
Age: 75
End: 2025-07-09

## 2025-07-11 NOTE — PROGRESS NOTES
[facility] [place] Encounter      Client Name: Dave Smart  Date: 2 July 2025    Staff Name: [facility] Maynard   Location: Home    Start Time: 1200 End Time: 1300  Driving time: 1 hour          Goal for today's meeting: Discuss 's goals and progress      Summary of today's meeting:   Goals:   Goal 1 Do more work with partner around the outside of their home.   Narrative:  Met with  at home at 1300. He has been having a lot of knee complications and made an appointment to take care of the pain. He had said there is little they can do due to other surgeries he has had. He also stated he was going to be getting outside more to help do things around the property like cutting wood and the grass. We had decided that this would be a goal for him to do it atleast a couple days a week pending any mobility issues.        Plan for next meeting:   Next week we will continue to work toward him being outside more often within his limits to benefit him physically and mentally within his capabilities.

## 2025-07-16 ENCOUNTER — PATIENT OUTREACH (OUTPATIENT)
Age: 75
End: 2025-07-16

## 2025-07-18 NOTE — PROGRESS NOTES
[facility] [place] Encounter      Client Name: Dave Smart  Date: 9 July 2025    Staff Name: [place] Maynard   Location: Home    Start Time: 1200 End Time: 1300  Driving time: 1 hour          Goal for today's meeting: Discuss goals and progress      Summary of today's meeting:   Goals:   Goal 1 Do more work with partner around the outside of their home.   Narrative:  Met with  at home at 1200. After another evaluation, he was told again that there is nothing more they can do for his knees. Pain management is his only option. He had got out a couple days early before the heat and rain to split wood and help his significant other. His pain levels are high but he continues to drive on.        Plan for next meeting:   Next week we will continue to work toward him being outside more often within his limits to benefit him physically and mentally within his capabilities.

## 2025-07-24 ENCOUNTER — PATIENT OUTREACH (OUTPATIENT)
Age: 75
End: 2025-07-24

## 2025-07-25 NOTE — PROGRESS NOTES
[facility] [place] Encounter      Client Name: Dave Smart  Date: 16 July 2025    Staff Name: [facility] Maynard    Location: Home    Start Time: 1200 End Time: 1300  Driving time: 1 hour           Goal for today's meeting: Discuss goals and progress      Summary of today's meeting:   Goals:   Goal 1 Do more work with partner around the outside of their home.   Narrative:  Met with  at home at 1300. He had received some shots in his knee a couple days prior for pain management and he seemed more mobile with little to no pain. He had gone out twice to help with chores around the property but came back in early due to the high heat. He has an appointment with a pain management specialist coming up since there is nothing permanent that can be done for his knees. I had talked with him about a future case load increase and he was open to meeting every other week instead of each.        Plan for next meeting:   Next appointment we will continue to work toward him being outside more often within his limits to benefit him physically and mentally within his capabilities.

## 2025-07-30 ENCOUNTER — PATIENT OUTREACH (OUTPATIENT)
Age: 75
End: 2025-07-30

## (undated) DEVICE — DECANTER: Brand: UNBRANDED

## (undated) DEVICE — SUT CHROMIC 4-0 P-3 18 MM 1654G

## (undated) DEVICE — SINGLE PORT MANIFOLD: Brand: NEPTUNE 2

## (undated) DEVICE — NEEDLE 18 G X 1 1/2

## (undated) DEVICE — PAD GROUNDING ADULT

## (undated) DEVICE — HYDROPHILIC WOUND DRESSING WITH ZINC PLUS VITAMINS A AND B6.: Brand: DERMAGRAN®-B

## (undated) DEVICE — MEDI-VAC YANK SUCT HNDL W/TPRD BULBOUS TIP: Brand: CARDINAL HEALTH

## (undated) DEVICE — BULB SYRINGE,IRRIGATION WITH PROTECTIVE CAP: Brand: DOVER

## (undated) DEVICE — ADHESIVE SKIN CLSR DERMABOND NX

## (undated) DEVICE — INTENDED FOR TISSUE SEPARATION, AND OTHER PROCEDURES THAT REQUIRE A SHARP SURGICAL BLADE TO PUNCTURE OR CUT.: Brand: BARD-PARKER SAFETY BLADES SIZE 15, STERILE

## (undated) DEVICE — 10FR FRAZIER SUCTION HANDLE: Brand: CARDINAL HEALTH

## (undated) DEVICE — SUT CHROMIC 4-0 PS-2 18 IN 1637G

## (undated) DEVICE — ASCOPE 4 RHINOLARYNGO SLIM: Brand: ASCOPE™ 4 RHINOLARYNGO SLIM

## (undated) DEVICE — TIBURON SPLIT SHEET: Brand: CONVERTORS

## (undated) DEVICE — SUT ETHILON 3-0 PS-1 18 IN 1663G

## (undated) DEVICE — PENCIL ELECTROSURG E-Z CLEAN -0035H

## (undated) DEVICE — SUT VICRYL 4-0 PS-2 27 IN J426H

## (undated) DEVICE — ENDOSCOPY PORT CONNECTOR FOR OLYMPUS® SCOPES: Brand: ERBE

## (undated) DEVICE — SUT VICRYL 4-0 PS-2 18 IN J496G

## (undated) DEVICE — DRESSING TELFA 2 X 3 IN STRL

## (undated) DEVICE — TUBING SUCTION 5MM X 12 FT

## (undated) DEVICE — STERILIZATION TEETH GUARDS

## (undated) DEVICE — MEDI-VAC YANKAUER SUCTION HANDLE W/STRAIGHT TIP & CONTROL VENT: Brand: CARDINAL HEALTH

## (undated) DEVICE — MAYO STAND COVER: Brand: CONVERTORS

## (undated) DEVICE — DISPOSABLE OR TOWEL: Brand: CARDINAL HEALTH

## (undated) DEVICE — SYRINGE 10ML LL

## (undated) DEVICE — GAUZE SPONGES,16 PLY: Brand: CURITY

## (undated) DEVICE — HYBRID CO2 TUBING/CAP SET FOR OLYMPUS® SCOPES: Brand: ERBE

## (undated) DEVICE — UTILITY MARKER,BLACK WITH LABELS: Brand: DEVON

## (undated) DEVICE — NEEDLE 25G X 1 1/2

## (undated) DEVICE — SUT CHROMIC 5-0 P-3 18 IN 687G

## (undated) DEVICE — CHLORAPREP HI-LITE 10.5ML ORANGE

## (undated) DEVICE — GLOVE INDICATOR PI UNDERGLOVE SZ 8 BLUE

## (undated) DEVICE — BETHLEHEM UNIVERSAL OUTPATIENT: Brand: CARDINAL HEALTH